# Patient Record
(demographics unavailable — no encounter records)

---

## 2024-10-17 NOTE — PLAN
[No] : No [Medication education provided] : Medication education provided. [Rationale for medication choices, possible risks/precautions, benefits, alternative treatment choices, and consequences of non-treatment discussed] : Rationale for medication choices, possible risks/precautions, benefits, alternative treatment choices, and consequences of non-treatment discussed with patient/family/caregiver  [FreeTextEntry4] : Continue to experience therapeutic gains due to compliance with visits and medications [FreeTextEntry5] : - continue Effexor  mg po daily for mood and anxiety - continue Abilify 15 mg po daily for mood stabilization - continue Trazodone 200 mg qhs for insomnia - continue Klonopin 1 mg qhs plus 1 pill prn for strong anxiety during the day - f/u in 1 month or sooner PRN for next med mgmt visit

## 2024-10-17 NOTE — DISCUSSION/SUMMARY
[FreeTextEntry1] : Patient is a 65 year old  single unemployed woman, domiciled with her family, with history of MDD and JUSTIN, remote history of cocaine and opioid dependence, in sustained remission, on MMPT, presenting for follow up today.  She reported support from her family members and did not endorse any suicidal or homicidal ideation. Mood and anxiety symptoms appear well-controlled since increase in dose of Effexor. Christelle presented to be stable, able to manage her daily responsibilities. No acute safety concerns identified, adherent with medications and appointments. Risks/benefits of benzodiazepine use (including small risk of tolerance and dependence) were discussed with patient. Patient appears appropriate for continuing outpatient level of care at this time.

## 2024-10-17 NOTE — HISTORY OF PRESENT ILLNESS
[Not Applicable] : Not applicable [FreeTextEntry1] : Pt presents for scheduled medication management follow up. She reports doing well, endorses stable mood. She continues to care for her grandson. Plans to visit elderly aunt next month. States her sleep and appetite are intact. She continues to take her medications regularly, with good response and tolerability. Patient denies any symptoms suggestive of jarrod or psychosis. Denies any suicidal or homicidal ideation at this time. Denies any acute concerns. Remains connected to family, cares for grandson, and engaged in treatment. She is looking forward to traveling to [place] to celebrate her aunt 101st birthday. [FreeTextEntry2] : Long history of MDD and anxiety, remote and extensive history of cocaine, heroin use disorders in remission, has been sober for more than 15 years.  [FreeTextEntry3] : prazosin 1mg caused headaches

## 2024-10-17 NOTE — SOCIAL HISTORY
[FreeTextEntry1] : lives with family (son, daughter, grandchildren), unemployed, mother of 6 adult children, history of common law marriage, high school education\par  \par  Substance: remote history of cocaine and opiate abuse, has been sober for the last 15 years

## 2024-10-17 NOTE — PHYSICAL EXAM
[None] : none [Cooperative] : cooperative [Full] : full [Clear] : clear [Linear/Goal Directed] : linear/goal directed [Average] : average [WNL] : within normal limits [FreeTextEntry8] : "good"

## 2024-10-17 NOTE — REASON FOR VISIT
[Patient preference] : as per patient preference [Continuity of care] : to ensure continuity of care [Telehealth (audio & video) - Individual/Group] : This visit was provided via telehealth using real-time 2-way audio visual technology. [Other Location: e.g. Home (Enter Location, City,State)___] : The provider was located at [unfilled]. [Home] : The patient, [unfilled], was located at home, [unfilled], at the time of the visit. [Verbal consent obtained from patient/other participant(s)] : Verbal consent for telehealth/telephonic services obtained from patient/other participant(s) [Patient] : Patient [TextBox_17] : Christelle Zafar [FreeTextEntry1] : maintenance treatment for depression and anxiety

## 2024-11-27 NOTE — HISTORY OF PRESENT ILLNESS
[FreeTextEntry1] : Pt presents for scheduled medication management follow up. She reports doing well, endorses stable mood. She plans to travel to [place] later today and is looking forward to her trip. She continues to care for her grandson. States her sleep and appetite are intact. She continues to take her medications regularly, with good response and tolerability. Patient denies any symptoms suggestive of jarrod or psychosis. Denies any suicidal or homicidal ideation at this time. Denies any acute concerns. Remains connected to family, cares for grandson, and engaged in treatment.  [FreeTextEntry2] : Long history of MDD and anxiety, remote and extensive history of cocaine, heroin use disorders in remission, has been sober for more than 15 years.  [FreeTextEntry3] : prazosin 1mg caused headaches

## 2024-11-27 NOTE — DISCUSSION/SUMMARY
[FreeTextEntry1] : Patient is a 66 year old  single unemployed woman, domiciled with her family, with history of MDD and JUSTIN, remote history of cocaine and opioid dependence, in sustained remission, on MMPT, presenting for follow up today.  She reported support from her family members and did not endorse any suicidal or homicidal ideation. Mood and anxiety symptoms appear well-controlled since increase in dose of Effexor. Christelle presented to be stable, able to manage her daily responsibilities. No acute safety concerns identified, adherent with medications and appointments. Risks/benefits of benzodiazepine use (including small risk of tolerance and dependence) were discussed with patient. Patient appears appropriate for continuing outpatient level of care at this time.

## 2024-11-27 NOTE — HISTORY OF PRESENT ILLNESS
[FreeTextEntry1] : Pt presents for scheduled medication management follow up. She reports doing well, endorses stable mood. She plans to travel to [place] later today and is looking forward to her trip. She continues to care for her grandson. States her sleep and appetite are intact. She continues to take her medications regularly, with good response and tolerability. Patient denies any symptoms suggestive of jarrod or psychosis. Denies any suicidal or homicidal ideation at this time. Denies any acute concerns. Remains connected to family, cares for grandson, and engaged in treatment.  [FreeTextEntry2] : Long history of MDD and anxiety, remote and extensive history of cocaine, heroin use disorders in remission, has been sober for more than 15 years.  [FreeTextEntry3] : prazosin 1mg caused headaches

## 2024-11-27 NOTE — PLAN
[FreeTextEntry4] : Continue to experience therapeutic gains due to compliance with visits and medications [FreeTextEntry5] : - continue Effexor  mg po daily for mood and anxiety - continue Abilify 15 mg po daily for mood stabilization - continue Trazodone 200 mg qhs for insomnia - continue Klonopin 1 mg qhs plus 1 pill prn for strong anxiety during the day - f/u in 1 month or sooner PRN for next med mgmt visit

## 2024-11-27 NOTE — REASON FOR VISIT
[TextBox_17] : Christelle Zafar [FreeTextEntry1] : maintenance treatment for depression and anxiety

## 2024-12-19 NOTE — HISTORY OF PRESENT ILLNESS
[Not Applicable] : Not applicable [FreeTextEntry1] : Pt presents for scheduled medication management follow up. She reports doing well, endorses stable mood. She reports enjoying recent trip to [place]. She now plans to spend upcoming [place] holiday with her daughter in [place]. States her sleep and appetite are intact. She continues to take her medications regularly, with good response and tolerability. Patient denies any symptoms suggestive of jarrod or psychosis. Denies any suicidal or homicidal ideation at this time. Denies any acute concerns. Remains connected to family, cares for grandson, and engaged in treatment.  [FreeTextEntry2] : Long history of MDD and anxiety, remote and extensive history of cocaine, heroin use disorders in remission, has been sober for more than 15 years.  [FreeTextEntry3] : prazosin 1mg caused headaches

## 2025-01-23 NOTE — HISTORY OF PRESENT ILLNESS
[FreeTextEntry1] : Pt presents for scheduled medication management follow up. She reports doing well, endorses stable mood. She enjoyed her holidays and recent trip, has since returned home. States her sleep and appetite are intact. She continues to take her medications regularly, with good response and tolerability. Patient denies any symptoms suggestive of jarrod or psychosis. Denies any suicidal or homicidal ideation at this time. Denies any acute concerns. Remains connected to family, cares for grandson, and engaged in treatment.  [Not Applicable] : Not applicable [FreeTextEntry2] : Long history of MDD and anxiety, remote and extensive history of cocaine, heroin use disorders in remission, has been sober for more than 15 years.  [FreeTextEntry3] : prazosin 1mg caused headaches

## 2025-02-20 NOTE — HISTORY OF PRESENT ILLNESS
[Not Applicable] : Not applicable [FreeTextEntry1] : Pt presents for scheduled medication management follow up. She reports doing well, endorses stable mood. States her sleep and appetite are intact. She continues to take her medications regularly, with good response and tolerability. Patient denies any symptoms suggestive of jarrod or psychosis. Denies any suicidal or homicidal ideation at this time. Denies any acute concerns. Remains connected to family, cares for grandson, and engaged in treatment.  [FreeTextEntry2] : Long history of MDD and anxiety, remote and extensive history of cocaine, heroin use disorders in remission, has been sober for more than 15 years.  [FreeTextEntry3] : prazosin 1mg caused headaches

## 2025-03-24 NOTE — PLAN
[No] : No [Medication education provided] : Medication education provided. [Rationale for medication choices, possible risks/precautions, benefits, alternative treatment choices, and consequences of non-treatment discussed] : Rationale for medication choices, possible risks/precautions, benefits, alternative treatment choices, and consequences of non-treatment discussed with patient/family/caregiver  [FreeTextEntry4] : Continue to experience therapeutic gains due to compliance with visits and medications [FreeTextEntry5] : - continue Effexor  mg po daily for mood and anxiety - continue Abilify 15 mg po daily for mood stabilization - continue Trazodone 200 mg qhs for insomnia - continue Klonopin 1 mg qhs plus 1 pill prn for strong anxiety during the day - f/u in 1 month or sooner PRN for next med mgmt visit  MMTP at [facility] methadone 130mg

## 2025-03-24 NOTE — PLAN
[No] : No [Medication education provided] : Medication education provided. [Rationale for medication choices, possible risks/precautions, benefits, alternative treatment choices, and consequences of non-treatment discussed] : Rationale for medication choices, possible risks/precautions, benefits, alternative treatment choices, and consequences of non-treatment discussed with patient/family/caregiver  [FreeTextEntry4] : Continue to experience therapeutic gains due to compliance with visits and medications [FreeTextEntry5] : - continue Effexor  mg po daily for mood and anxiety - continue Abilify 15 mg po daily for mood stabilization - continue Trazodone 200 mg qhs for insomnia - continue Klonopin 1 mg qhs plus 1 pill prn for strong anxiety during the day - f/u in 1 month or sooner PRN for next med mgmt visit  [facility] at [facility] methadone 130mg

## 2025-04-17 NOTE — PLAN
[No] : No [Medication education provided] : Medication education provided. [Rationale for medication choices, possible risks/precautions, benefits, alternative treatment choices, and consequences of non-treatment discussed] : Rationale for medication choices, possible risks/precautions, benefits, alternative treatment choices, and consequences of non-treatment discussed with patient/family/caregiver  [FreeTextEntry4] : Continue to experience therapeutic gains due to compliance with visits and medications [FreeTextEntry5] : - continue Effexor  mg po daily for mood and anxiety - continue Abilify 15 mg po daily for mood stabilization - continue Trazodone 200 mg qhs for insomnia - continue Klonopin 1 mg qhs plus 1 pill prn for strong anxiety during the day --pt has been educated on risks of benzo use - f/u in 1 month   MMTP at [facility] methadone 130mg

## 2025-04-17 NOTE — DISCUSSION/SUMMARY
[FreeTextEntry1] : Patient is a 66 year old  single unemployed woman, domiciled with her family, with history of MDD and JUSTIN, remote history of cocaine and opioid dependence, in sustained remission, on MMPT, presenting for follow up today.  She reported support from her family members and did not endorse any suicidal or homicidal ideation. Mood and anxiety symptoms appear well-controlled since increase in dose of Effexor. Christelle presented to be stable, able to manage her daily responsibilities. No acute safety concerns identified, adherent with medications and appointments. Risks/benefits of benzodiazepine use (including small risk of tolerance and dependence) were discussed with patient. Patient appears appropriate for continuing outpatient level of care at this time. 666

## 2025-04-17 NOTE — HISTORY OF PRESENT ILLNESS
[FreeTextEntry1] : Pt presents for scheduled medication management follow up. She reports doing well, endorses stable mood. States her sleep and appetite are intact. She continues to take her medications regularly, with good response and tolerability. Patient denies any symptoms suggestive of jarrod or psychosis. Denies any suicidal or homicidal ideation at this time. Denies any acute concerns. Remains connected to family, cares for grandson, and engaged in treatment. She plans to sit for her family's pets later today. [Not Applicable] : Not applicable [FreeTextEntry2] : Long history of MDD and anxiety, remote and extensive history of cocaine, heroin use disorders in remission, has been sober for more than 15 years.  [FreeTextEntry3] : prazosin 1mg caused headaches

## 2025-05-29 NOTE — PLAN
[No] : No [Medication education provided] : Medication education provided. [Rationale for medication choices, possible risks/precautions, benefits, alternative treatment choices, and consequences of non-treatment discussed] : Rationale for medication choices, possible risks/precautions, benefits, alternative treatment choices, and consequences of non-treatment discussed with patient/family/caregiver  [FreeTextEntry4] : Continue to experience therapeutic gains due to compliance with visits and medications [FreeTextEntry5] : - continue Effexor  mg po daily for mood and anxiety - continue Abilify 15 mg po daily for mood stabilization - continue Trazodone 200 mg qhs for insomnia - continue Klonopin 1 mg qhs plus 1 pill prn for strong anxiety during the day --pt has been educated on risks of benzo use - f/u in 1 month   MMTP at [facility] methadone 130mg

## 2025-05-29 NOTE — HISTORY OF PRESENT ILLNESS
[FreeTextEntry1] : Pt presents for scheduled medication management follow up. She reports doing well, endorses stable mood. States her sleep and appetite are intact. She continues to take her medications regularly, with good response and tolerability. Patient denies any symptoms suggestive of jarrod or psychosis. Denies any suicidal or homicidal ideation at this time. Denies any acute concerns. Remains connected to family, cares for grandson, and engaged in treatment. She plans to travel to [place] for 2 weeks in June and is looking forward to her upcoming trip.  [Not Applicable] : Not applicable [FreeTextEntry2] : Long history of MDD and anxiety, remote and extensive history of cocaine, heroin use disorders in remission, has been sober for more than 15 years.  [FreeTextEntry3] : prazosin 1mg caused headaches

## 2025-07-03 NOTE — HISTORY OF PRESENT ILLNESS
[Not Applicable] : Not applicable [FreeTextEntry1] : Pt presents for scheduled medication management follow up. She reports doing well, endorses stable mood. States her sleep and appetite are intact. She continues to take her medications regularly, with good response and tolerability. Patient denies any symptoms suggestive of jarrod or psychosis. Denies any suicidal or homicidal ideation at this time. Denies any acute concerns. Remains connected to family, cares for grandson, and engaged in treatment. She discusses having a nice time away in [place] visiting her family. [FreeTextEntry2] : Long history of MDD and anxiety, remote and extensive history of cocaine, heroin use disorders in remission, has been sober for more than 15 years.  [FreeTextEntry3] : prazosin 1mg caused headaches

## 2025-07-03 NOTE — PLAN
[No] : No [Medication education provided] : Medication education provided. [Rationale for medication choices, possible risks/precautions, benefits, alternative treatment choices, and consequences of non-treatment discussed] : Rationale for medication choices, possible risks/precautions, benefits, alternative treatment choices, and consequences of non-treatment discussed with patient/family/caregiver  [FreeTextEntry4] : Continue to experience therapeutic gains due to compliance with visits and medications [FreeTextEntry5] : - continue Effexor  mg po daily for mood and anxiety - continue Abilify 15 mg po daily for mood stabilization - continue Trazodone 200 mg qhs for insomnia - continue Klonopin 1 mg qhs plus 1 pill prn for strong anxiety during the day --pt has been educated on risks of benzo use - f/u in 1 month   [facility]MTP at [facility] methadone 130mg